# Patient Record
Sex: MALE | Race: WHITE | NOT HISPANIC OR LATINO | URBAN - METROPOLITAN AREA
[De-identification: names, ages, dates, MRNs, and addresses within clinical notes are randomized per-mention and may not be internally consistent; named-entity substitution may affect disease eponyms.]

---

## 2023-12-21 ENCOUNTER — OFFICE VISIT (OUTPATIENT)
Dept: URGENT CARE | Facility: CLINIC | Age: 63
End: 2023-12-21
Payer: MEDICARE

## 2023-12-21 VITALS
SYSTOLIC BLOOD PRESSURE: 125 MMHG | TEMPERATURE: 97 F | WEIGHT: 190 LBS | HEIGHT: 68 IN | HEART RATE: 57 BPM | BODY MASS INDEX: 28.79 KG/M2 | OXYGEN SATURATION: 98 % | DIASTOLIC BLOOD PRESSURE: 83 MMHG

## 2023-12-21 DIAGNOSIS — M79.672 FOOT PAIN, BILATERAL: Primary | ICD-10-CM

## 2023-12-21 DIAGNOSIS — M79.671 FOOT PAIN, BILATERAL: Primary | ICD-10-CM

## 2023-12-21 PROCEDURE — 99203 OFFICE O/P NEW LOW 30 MIN: CPT | Mod: S$GLB,,, | Performed by: NURSE PRACTITIONER

## 2023-12-21 PROCEDURE — 99203 PR OFFICE/OUTPT VISIT, NEW, LEVL III, 30-44 MIN: ICD-10-PCS | Mod: S$GLB,,, | Performed by: NURSE PRACTITIONER

## 2023-12-21 RX ORDER — METFORMIN HYDROCHLORIDE 750 MG/1
TABLET, EXTENDED RELEASE ORAL
COMMUNITY
Start: 2023-12-01

## 2023-12-21 RX ORDER — HYDROCHLOROTHIAZIDE 12.5 MG/1
1 CAPSULE ORAL DAILY
COMMUNITY
Start: 2023-12-08

## 2023-12-21 RX ORDER — LOSARTAN POTASSIUM 50 MG/1
1 TABLET ORAL EVERY MORNING
COMMUNITY
Start: 2023-06-29

## 2023-12-21 RX ORDER — NEBIVOLOL 10 MG/1
1 TABLET ORAL DAILY
COMMUNITY
Start: 2023-05-24

## 2023-12-21 RX ORDER — DICLOFENAC SODIUM 75 MG/1
75 TABLET, DELAYED RELEASE ORAL 2 TIMES DAILY PRN
Qty: 20 TABLET | Refills: 0 | Status: SHIPPED | OUTPATIENT
Start: 2023-12-21 | End: 2023-12-31

## 2023-12-21 RX ORDER — DICLOFENAC SODIUM 75 MG/1
75 TABLET, DELAYED RELEASE ORAL 2 TIMES DAILY
COMMUNITY
Start: 2023-09-26

## 2023-12-21 RX ORDER — OMEPRAZOLE 40 MG/1
1 CAPSULE, DELAYED RELEASE ORAL EVERY MORNING
COMMUNITY
Start: 2023-05-24

## 2023-12-21 RX ORDER — FUROSEMIDE 40 MG/1
1 TABLET ORAL DAILY
COMMUNITY
Start: 2023-12-14

## 2023-12-21 RX ORDER — TIZANIDINE 4 MG/1
1 TABLET ORAL EVERY 8 HOURS PRN
COMMUNITY
Start: 2023-07-17

## 2023-12-21 RX ORDER — OXYCODONE AND ACETAMINOPHEN 10; 325 MG/1; MG/1
1 TABLET ORAL 4 TIMES DAILY PRN
COMMUNITY

## 2023-12-21 RX ORDER — GABAPENTIN 600 MG/1
600 TABLET ORAL 4 TIMES DAILY
COMMUNITY

## 2023-12-21 RX ORDER — ATORVASTATIN CALCIUM 20 MG/1
1 TABLET, FILM COATED ORAL DAILY
COMMUNITY
Start: 2023-06-29

## 2023-12-21 NOTE — PROGRESS NOTES
"Subjective:      Patient ID: Melvin Baires is a 63 y.o. male.    Vitals:  height is 5' 8" (1.727 m) and weight is 86.2 kg (190 lb). His temperature is 96.8 °F (36 °C). His blood pressure is 125/83 and his pulse is 57 (abnormal). His oxygen saturation is 98%.     Chief Complaint: Gout    Pt c/o gout flare in his L foot x's 2 months      ROS   Objective:     Physical Exam    Assessment:     No diagnosis found.    Plan:       There are no diagnoses linked to this encounter.                "

## 2023-12-21 NOTE — PROGRESS NOTES
CHIEF COMPLAINT  Chief Complaint   Patient presents with    Gout       HPI  Melvin Weaver a 63 y.o. male who presents with c/o bilateral foot pain x 5 months. Pt reports he was seen by another urgent care in August for pain to bottom of right foot and given Voltaren and prednisone. Pt reports pain improved and then started to left foot in October. Pt reports the pain is to bottom of the foot and heel. Pt reports pain is worse with ambulation. Pt denies numbness, tingling, decreased sensation, decreased range of motion.  Patient denies any injuries.  Patient reports he has been applying ice and rolling his foot on a bottle with no relief of symptoms.  Patient reports he has appointment with his primary care provider on January 3rd for his yearly blood work and A1c testing      CURRENT MEDICATIONS  Current Outpatient Medications on File Prior to Visit   Medication Sig Dispense Refill    aspirin 81 mg Cap Take 81 mg by mouth.      atorvastatin (LIPITOR) 20 MG tablet Take 1 tablet by mouth once daily.      diclofenac (VOLTAREN) 75 MG EC tablet Take 75 mg by mouth 2 (two) times daily.      furosemide (LASIX) 40 MG tablet Take 1 tablet by mouth once daily.      gabapentin (NEURONTIN) 600 MG tablet Take 600 mg by mouth 4 (four) times daily.      hydroCHLOROthiazide (MICROZIDE) 12.5 mg capsule Take 1 capsule by mouth once daily.      losartan (COZAAR) 50 MG tablet Take 1 tablet by mouth every morning.      metFORMIN (GLUCOPHAGE-XR) 750 MG ER 24hr tablet TAKE 2 TABLETS BY MOUTH DAILY WITH BREAKFAST.      nebivoloL (BYSTOLIC) 10 MG Tab Take 1 tablet by mouth once daily.      omeprazole (PRILOSEC) 40 MG capsule Take 1 capsule by mouth every morning.      oxyCODONE-acetaminophen (PERCOCET)  mg per tablet Take 1 tablet by mouth 4 (four) times daily as needed.      tiZANidine (ZANAFLEX) 4 MG tablet Take 1 tablet by mouth every 8 (eight) hours as needed.       No current facility-administered medications on file prior to  visit.       ALLERGIES  Review of patient's allergies indicates:   Allergen Reactions    Amitriptyline      Other Reaction(s): Other (See Comments)    muscle spasms and jerking all over         There is no immunization history on file for this patient.    PAST MEDICAL HISTORY  History reviewed. No pertinent past medical history.    SURGICAL HISTORY  History reviewed. No pertinent surgical history.    SOCIAL HISTORY  Social History     Socioeconomic History    Marital status: Single       FAMILY HISTORY  History reviewed. No pertinent family history.    REVIEW OF SYSTEMS  Constitutional: No fever, chills, or weakness.  Respiratory: No cough, wheezing or shortness of breath  Cardiovascular: No chest pain, palpitations or edema  Musculoskeletal: bilateral foot pain, full range of motion. Good sensation  Skin: No rash or abrasion  Neurologic: No focal weakness or sensory changes.  All systems otherwise negative except as noted in the Review of Systems and History of Present Illness      PHYSICAL EXAM  Reviewed Triage Note  VITAL SIGNS: 125/83  Constitutional: Well developed, well nourished, Alert and oriented x3, No acute distress, non-toxic appearance.  Respiratory: Normal breath sounds, no respiratory distress, no wheezing, no rhonchi, no rales  Cardiovascular: Normal heart rate, normal rhythm, no murmurs, no rubs, no gallops.  Musculoskeletal: No edema, tenderness to bottom of left foot and left heel, no cyanosis, no clubbing. Good range of motion in all major joints. Integument: Warm, Dry, No erythema, no rash  Neurologic: Normal motor function, normal sensory function. No focal deficits noted. Intact distal pulses  Psychiatric: Affect normal, judgment normal, mood normal        MEDICAL DECISION MAKING    Physical exam findings discussed with patient. No acute emergent medical condition identified at this time to warrant further testing. Will dispo home with instructions to follow up with PCP as scheduled on January  3rd.  Script for Voltaren and lidocaine sent to pharmacy of choice with instructions on usage.  Referral for Podiatry also placed.  Pt agrees with plan of care.     DISPOSITION  Patient discharged in stable condition       CLINICAL IMPRESSION:  The encounter diagnosis was Foot pain, bilateral.    Patient advised to follow-up with your PCP within 3 days for BP re-check if Blood Pressure was >120/80 without history of hypertension.